# Patient Record
Sex: FEMALE | Race: WHITE
[De-identification: names, ages, dates, MRNs, and addresses within clinical notes are randomized per-mention and may not be internally consistent; named-entity substitution may affect disease eponyms.]

---
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
## 2021-12-22 ENCOUNTER — EMERGENCY (EMERGENCY)
Facility: HOSPITAL | Age: 21
LOS: 1 days | Discharge: HOME | End: 2021-12-22
Admitting: EMERGENCY MEDICINE
Payer: COMMERCIAL

## 2021-12-22 ENCOUNTER — EMERGENCY (EMERGENCY)
Facility: HOSPITAL | Age: 21
LOS: 0 days | Discharge: ANOTHER HOSPITAL | End: 2021-12-22
Attending: EMERGENCY MEDICINE
Payer: COMMERCIAL

## 2021-12-22 VITALS
OXYGEN SATURATION: 100 % | SYSTOLIC BLOOD PRESSURE: 107 MMHG | RESPIRATION RATE: 16 BRPM | HEART RATE: 79 BPM | DIASTOLIC BLOOD PRESSURE: 62 MMHG | HEIGHT: 67 IN | TEMPERATURE: 98 F | WEIGHT: 190.04 LBS

## 2021-12-22 VITALS
OXYGEN SATURATION: 100 % | DIASTOLIC BLOOD PRESSURE: 70 MMHG | SYSTOLIC BLOOD PRESSURE: 108 MMHG | RESPIRATION RATE: 18 BRPM | HEART RATE: 72 BPM | TEMPERATURE: 98 F

## 2021-12-22 VITALS
DIASTOLIC BLOOD PRESSURE: 69 MMHG | TEMPERATURE: 98 F | RESPIRATION RATE: 16 BRPM | HEIGHT: 67 IN | SYSTOLIC BLOOD PRESSURE: 119 MMHG | OXYGEN SATURATION: 99 % | HEART RATE: 70 BPM

## 2021-12-22 DIAGNOSIS — J45.909 UNSPECIFIED ASTHMA, UNCOMPLICATED: ICD-10-CM

## 2021-12-22 DIAGNOSIS — Z20.822 CONTACT WITH AND (SUSPECTED) EXPOSURE TO COVID-19: ICD-10-CM
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
## 2021-12-22 DIAGNOSIS — H16.001 UNSPECIFIED CORNEAL ULCER, RIGHT EYE: ICD-10-CM

## 2021-12-22 DIAGNOSIS — H57.13 OCULAR PAIN, BILATERAL: ICD-10-CM
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
## 2021-12-22 DIAGNOSIS — Z91.013 ALLERGY TO SEAFOOD: ICD-10-CM

## 2021-12-22 LAB
FLUAV AG NPH QL: SIGNIFICANT CHANGE UP
FLUBV AG NPH QL: SIGNIFICANT CHANGE UP
SARS-COV-2 RNA SPEC QL NAA+PROBE: SIGNIFICANT CHANGE UP

## 2021-12-22 PROCEDURE — 99285 EMERGENCY DEPT VISIT HI MDM: CPT

## 2021-12-22 PROCEDURE — 99284 EMERGENCY DEPT VISIT MOD MDM: CPT

## 2021-12-22 RX ORDER — MOXIFLOXACIN HCL 0.5 %
2 DROPS OPHTHALMIC (EYE) ONCE
Refills: 0 | Status: COMPLETED | OUTPATIENT
Start: 2021-12-22 | End: 2021-12-22

## 2021-12-22 RX ORDER — IBUPROFEN 200 MG
600 TABLET ORAL ONCE
Refills: 0 | Status: COMPLETED | OUTPATIENT
Start: 2021-12-22 | End: 2021-12-22

## 2021-12-22 RX ORDER — MOXIFLOXACIN HCL 0.5 %
1 DROPS OPHTHALMIC (EYE)
Qty: 1 | Refills: 0
Start: 2021-12-22 | End: 2021-12-28

## 2021-12-22 RX ORDER — MOXIFLOXACIN HCL 0.5 %
2 DROPS OPHTHALMIC (EYE)
Qty: 2 | Refills: 0
Start: 2021-12-22 | End: 2021-12-28

## 2021-12-22 RX ORDER — ERYTHROMYCIN BASE 5 MG/GRAM
1 OINTMENT (GRAM) OPHTHALMIC (EYE) ONCE
Refills: 0 | Status: DISCONTINUED | OUTPATIENT
Start: 2021-12-22 | End: 2021-12-22

## 2021-12-22 RX ADMIN — Medication 2 DROP(S): at 16:19

## 2021-12-22 RX ADMIN — Medication 600 MILLIGRAM(S): at 16:20

## 2021-12-22 NOTE — ED PROVIDER NOTE - CLINICAL SUMMARY MEDICAL DECISION MAKING FREE TEXT BOX
22 y/o female transfer from [facility] for possible corneal ulcer  -optho consult  -

## 2021-12-22 NOTE — ED PROVIDER NOTE - PROGRESS NOTE DETAILS
Opthalmology Dr. Vidales and Shahram consulted, recommended transfer for further evaluation. One dose of moxifloxacin given, per optho request hold further dose until seen. Pt stable for transfer.

## 2021-12-22 NOTE — ED ADULT TRIAGE NOTE - HEART RATE (BEATS/MIN)
No results found for: HGBA1C    Recent Labs      09/04/18   1129  09/04/18   1559  09/04/18   2115  09/05/18   0719   POCGLU  140  155*  171*  130       Blood Sugar Average: Last 72 hrs:  · (P) 547 [id number]   · Insulin was decreased due to hypoglycemia-- blood sugars have been better controlled   · Monitor blood sugars 70

## 2021-12-22 NOTE — ED PROVIDER NOTE - CLINICAL SUMMARY MEDICAL DECISION MAKING FREE TEXT BOX
DDx: Corneal ulcer.   Plan: Irrigation, Azithromycin and Optho follow up. DDx: Corneal ulcer. Concerning given contact lens wearer  Plan: Irrigation, moxifloxacin and Optho follow up.

## 2021-12-22 NOTE — ED ADULT TRIAGE NOTE - CHIEF COMPLAINT QUOTE
burning both eyes rt eye worse after using .9ns to flush eyes and removed contacts Pt states wasn't her normal brand, and is having light sensitivity. "feels like soap is in her eyes"

## 2021-12-22 NOTE — ED PROVIDER NOTE - OBJECTIVE STATEMENT
22 y/o F with a PMHx of Asthma presents to the ED c/o bilateral eye burning x2 days. Patient states her RT eye hurts more than the LT. Patient states she is unable to open her eyes due to burning. Patient uses disposable monthly contacts. Patient did not have her regular contact solution and brought a different saline solution. Patient kept the contacts in for less than 1 day. Since using the saline solution Patient has been having more irration and light sensitivity to the eyes.

## 2021-12-22 NOTE — ED PROVIDER NOTE - EYE, RIGHT
1mm diameter ulcer in the upper part of cornea. 1mm diameter ulcer in the upper part of cornea on R. eye with fluorescein uptake. EOMI

## 2021-12-22 NOTE — ED PROVIDER NOTE - PHYSICAL EXAMINATION
Gen: Well appearing in NAD  Head: NC/AT  Neck: trachea midline  Resp:  No distress  Ext: no deformities  Neuro:  A&O appears non focal  Skin:  Warm and dry as visualized  Psych:  Normal affect and mood     eye exam deferred - already being performed by optho - see consult note

## 2021-12-22 NOTE — ED PROVIDER NOTE - PATIENT PORTAL LINK FT
You can access the FollowMyHealth Patient Portal offered by [facility] by registering at the following website: [email or web address]. By joining [employer]’s FollowMyHealth portal, you will also be able to view your health information using other applications (apps) compatible with our system.

## 2021-12-22 NOTE — CONSULT NOTE ADULT - ASSESSMENT
***INCOMPLETE NOTE***  Assessment and Recommendations:  21y female w/ no pmh/poh presenting for OD pain and photophobia after taking out contact lens 1 day ago. Used saline solution to soak contact lens day prior. Found to have corneal abrasion right eye, no infiltrate seen     # corneal abrasion right eye  -no infiltrates seen  - start vigamox q1 hour to right eye (throughout the night)  - cornea swabbed, eye culture sent  - contact lens holiday  - will f/u outpatient clinic tomorrow 9am at address below      Holley Rodríguez MD PGY-2  [phone]  Also available on [employer] Teams    Outpatient follow-up: Patient should follow-up with his/her ophthalmologist or with [facility] Department of Ophthalmology at the address below     [facility]  [address] [place] [address]  [phone]  Assessment and Recommendations:  21y female w/ no pmh/poh presenting for OD pain and photophobia after taking out contact lens 1 day ago. Used saline solution to soak contact lens day prior. Found to have corneal abrasion right eye, no infiltrate seen.    # corneal abrasion right eye  -no infiltrates seen  - start vigamox q1 hour to right eye (throughout the night)  - cornea swabbed, eye culture sent  - contact lens holiday  - will f/u outpatient clinic tomorrow 9am at address below    Discussed with Dr. Aquino, chief.     Holley Rodríguez MD PGY-2  [phone]  Also available on [employer] Teams    Outpatient follow-up: Patient should follow-up with his/her ophthalmologist or with [facility] Department of Ophthalmology at the address below     [facility]  [address] [place] [address] [address] [place] [id number]  [phone]

## 2021-12-22 NOTE — ED PROVIDER NOTE - NSFOLLOWUPINSTRUCTIONS_ED_ALL_ED_FT
Patient should follow-up with his/her ophthalmologist or with [facility] Department of Ophthalmology tomorrow 12/23/2021 at the address below    [facility]  [address] [facility] [address] [place] [address]  [phone]     Please use Vigamox antibiotic drops to affected eye - one drop every hour until seeing eye doctor tomorrow.    Corneal Ulcer    A corneal ulcer is an open sore on the cornea. The cornea is the clear covering at the front and center of the eye. Corneal ulcers can affect your vision and may cause permanent damage if they are not treated.    What are the causes?  This condition may be caused by:    An infection. This is the most common cause. The infection may be from:    Bacteria.  Virus.  Fungus.  Parasite.    Foreign bodies in the eye, such as sand, glass, or small pieces of metal.  Dry eye syndrome.  Certain conditions that prevent eyelids from closing completely, such as Bell palsy.  An eye injury.  Contact lenses.    What increases the risk?  The following factors may make you more likely to develop this condition:    Wearing your contact lenses for too long or not taking care of them properly.  Having a weakened disease-fighting (immune) system.  Having a history of cold sores, chicken pox, or shingles.  Using steroid eye drops.    What are the signs or symptoms?  Symptoms of this condition include:    Eye pain. The pain is often severe.  Blurry vision.  Sensitivity to light.  Pus or thick discharge coming from your eye.  Eye redness.  Feeling like something is in your eye.  Watery or itchy eye.  Burning or stinging feeling.    When ulcers get large, they may be seen as a white spot on the cornea.    How is this diagnosed?  This condition is diagnosed based on your symptoms and medical history as well as an eye exam. Your health care provider may use a type of microscope (slit lamp) to examine the cornea. Eye drops may be put into the eye to make the ulcer easier to see.    Tissue samples or cultures from the eye may be done to see if an infection is causing the corneal ulcer. Numbing eye drops will be given before any samples or cultures are taken. The samples or cultures will be checked in the lab for bacteria, viruses, fungi, or parasites.    How is this treated?  Treatment for this condition depends on the cause. If your ulcer is severe, you may be given antibiotic eye drops until your health care provider knows the test results. Other treatments may include:    Antibiotic medicines by mouth, or medicines in the form of ointments or eye drops, to treat infections caused by bacteria, viruses, parasites, or funguses.  Over-the-counter or prescription pain medicine.  Steroid eye drops if the eye is inflamed and swollen.  An injection of medicine under the thin membrane covering the eyeball (conjunctiva). This allows medicine to reach the ulcer in high doses.  Removing the foreign body that caused the eye injury.  Artificial tears or a bandage contact lens if severe dry eyes caused the corneal ulcer.  Eye patching to reduce irritation from blinking and bright light.    If the corneal ulcer causes a scar on the cornea that interferes with vision, surgery may be needed to replace the cornea (corneal transplant).    Follow these instructions at home:  Medicines     Take or apply any over-the-counter and prescription medicines only as told by your health care provider.  If you were prescribed an antibiotic medicine, including pills, eye drops, or ointment, use it as told by your health care provider. Do not stop using the antibiotic even if your condition improves.    You may have to apply eye drops every few minutes to every hour for several days.  It may be necessary to set your alarm clock every few minutes to every hour during the night.    Use artificial tears as needed if you have dry eyes.  Lifestyle     Avoid wearing eye makeup.  Avoid bright lights. Use sunglasses if you have light sensitivity.  Do not wear contact lenses until your health care provider approves.  General instructions     Do not touch or rub your eye. This may increase the irritation and spread the infection.  If directed, wear your eye patch as told.  Do not drive or operate heavy machinery until your health care provider approves.  Apply cool packs to your eye to relieve discomfort and swelling.  If you have an infection, wash your hands often with soap and water. If soap and water are not available, use hand .  Keep all follow-up visits as told by your health care provider. This is important.  How is this prevented?  If you normally wear contact lenses:    Do not wear contact lenses while you sleep.  Wash your hands before removing contact lenses.  Properly sterilize and store your contact lenses.  Regularly clean your contact lens case.  Do not use your saliva or tap water to clean or wet your contact lenses.  Remove your contact lenses if your eye becomes irritated. You may put them back in once your eyes feel better.    Contact a health care provider if:  Your pain gets worse.  You have more discharge coming from your eye.  Get help right away if:  You have a change in vision.  Your eyelids or the skin around them is red or swollen.  Summary  A corneal ulcer is an open sore on the cornea.  Severe eye pain is a common symptom of a corneal ulcer.  Treatment for a corneal ulcer depends on the cause. It may include antibacterial, antiviral, or antifungal eye drops or ointment.

## 2021-12-22 NOTE — ED ADULT NURSE NOTE - OBJECTIVE STATEMENT
Pt presents to ED c/o bilateral eye pain/irritation, difficulty seeing, light sensitivity and burning X2 days R>L since wearing a new pair of monthly contacts. Pt denies headache, N/V, dizziness.

## 2021-12-22 NOTE — CONSULT NOTE ADULT - SUBJECTIVE AND OBJECTIVE BOX
[facility] DEPARTMENT OF OPHTHALMOLOGY - INITIAL ADULT CONSULT  -----------------------------------------------------------------------------------------------------------  Holley Rodríguez MD PGY-2  [phone]  Also available on Microsoft Teams  -----------------------------------------------------------------------------------------------------------    HPI: 22 y/o F with a PMHx of Asthma presents to the ED as a trasnfer from [facility] for optho evaluation for corneal ulcer. Pt. c/o bilateral eye burning x2 days. Patient states her RT eye hurts more than the LT. Patient states she is unable to open her eyes due to burning. Patient uses disposable monthly contacts. Patient did not have her regular contact solution and brought a different saline solution. Patient kept the contacts in for less than 1 day. Since using the saline solution Patient has been having more irration and light sensitivity to the eyes. Seen at [facility] - ulcer seen on right cornea and sent to ER for eval by opthalmology.    Interval History: Uses monthly contacts, does not overuse them over a month. Denies sleeping or taking showers in contact lens. On Monday she bought saline solution to clean her contacts bc she ran out of Biotrue solution. Next day, started to feel irritation to the eyes when she took out her contacts. Woke up this morning with pain and photophobia and came to ED.     PAST MEDICAL & SURGICAL HISTORY:  Asthma      Past Ocular History: none  Ophthalmic Medications: none  FAMILY HISTORY:    MEDICATIONS  (STANDING):    MEDICATIONS  (PRN):    Allergies & Intolerances:   Seafood (Angioedema)      VITALS: T(C): 36.8 (12-22-21 @ 18:00)  T(F): 98.2 (12-22-21 @ 18:00), Max: 98.3 (12-22-21 @ 12:08)  HR: 70 (12-22-21 @ 18:00) (70 - 79)  BP: 119/69 (12-22-21 @ 18:00) (107/62 - 119/69)  RR:  (16 - 18)  SpO2:  (99% - 100%)  Wt(kg): --  General: AAO x 3, appropriate mood and affect    Ophthalmology Exam:  Visual acuity (sc): 20/25-2 OD, 20/20 OS   Pupils: PERRL OU, no APD  Ttono: 10, 14   Extraocular movements (EOMs): Full OU, no pain, no diplopia  Confrontational Visual Field (CVF): Full OU  Color Plates: 12/12 OU    Slit Lamp Exam (SLE)  External: Flat OU  Lids/Lashes/Lacrimal Ducts: Flat OU    Sclera/Conjunctiva: W+Q OU  Cornea: Cl OU  Anterior Chamber: D+Q OU    Iris: Flat OU  Lens: Cl OU     Fundus Exam: dilated with 1% tropicamide and 2.5% phenylephrine  Approval obtained from primary team for dilation  Patient aware that pupils can remained dilated for at least 4-6 hours  Exam performed with 20D lens    Vitreous: wnl OU  Disc, cup/disc: sharp and pink, 0.4 OU  Macula: wnl OU  Vessels: wnl OU  Periphery: wnl OU    Labs/Imaging:  ***   [facility] DEPARTMENT OF OPHTHALMOLOGY - INITIAL ADULT CONSULT  -----------------------------------------------------------------------------------------------------------  Holley Rodríguez MD PGY-2  [phone]  Also available on Microsoft Teams  -----------------------------------------------------------------------------------------------------------    HPI: 20 y/o F with a PMHx of Asthma presents to the ED as a trasnfer from [facility] for optho evaluation for corneal ulcer. Pt. c/o bilateral eye burning x2 days. Patient states her RT eye hurts more than the LT. Patient states she is unable to open her eyes due to burning. Patient uses disposable monthly contacts. Patient did not have her regular contact solution and brought a different saline solution. Patient kept the contacts in for less than 1 day. Since using the saline solution Patient has been having more irration and light sensitivity to the eyes. Seen at [facility] - ulcer seen on right cornea and sent to ER for eval by opthalmology.    Interval History: Uses monthly contacts, does not overuse them over a month. Denies sleeping or taking showers in contact lens. On Monday she bought saline solution to clean her contacts bc she ran out of Biotrue solution. Next day, started to feel irritation to the eyes when she took out her contacts. Woke up this morning with pain and photophobia and came to ED.     PAST MEDICAL & SURGICAL HISTORY:  Asthma      Past Ocular History: none  Ophthalmic Medications: none  FAMILY HISTORY:    MEDICATIONS  (STANDING):    MEDICATIONS  (PRN):    Allergies & Intolerances:   Seafood (Angioedema)      VITALS: T(C): 36.8 (12-22-21 @ 18:00)  T(F): 98.2 (12-22-21 @ 18:00), Max: 98.3 (12-22-21 @ 12:08)  HR: 70 (12-22-21 @ 18:00) (70 - 79)  BP: 119/69 (12-22-21 @ 18:00) (107/62 - 119/69)  RR:  (16 - 18)  SpO2:  (99% - 100%)  Wt(kg): --  General: AAO x 3, appropriate mood and affect    Ophthalmology Exam:  Visual acuity (sc): 20/25-2 OD, 20/20 OS   Pupils: PERRL OU, no APD  Ttono: 10, 14   Extraocular movements (EOMs): Full OU, no pain, no diplopia  Confrontational Visual Field (CVF): Full OU  Color Plates: 12/12 OU    Slit Lamp Exam (SLE)  External: Flat OU  Lids/Lashes/Lacrimal Ducts: mucopurulent discharge OD, wnl OS  Sclera/Conjunctiva: 1+ injection OD, W+Q OS  Cornea: 4mm x4mm central epi defect OD, no infiltrates seen. 2+ spk OS.  Anterior Chamber: D+Q OU    Iris: Flat OU  Lens: Cl OU     Fundus Exam: dilated with 1% tropicamide and 2.5% phenylephrine  Approval obtained from primary team for dilation  Patient aware that pupils can remained dilated for at least 4-6 hours  Exam performed with 20D lens    Vitreous: wnl OU  Disc, cup/disc: sharp and pink, 0.3/0.4   Macula: wnl OU  Vessels: wnl OU  Periphery: wnl OD, lattice OS

## 2021-12-22 NOTE — ED ADULT TRIAGE NOTE - CHIEF COMPLAINT QUOTE
Patient arrives with ems as a transfer from [facility] for a right corneal ulcer. Patient has right eye pain , blurry vision and sensitivity to light.

## 2021-12-22 NOTE — ED ADULT NURSE NOTE - CHPI ED NUR SYMPTOMS NEG
no blurred vision/no discharge/no double vision/no eye lid swelling/no foreign body/no itching/no purulent drainage

## 2021-12-24 LAB
CULTURE RESULTS: SIGNIFICANT CHANGE UP
SPECIMEN SOURCE: SIGNIFICANT CHANGE UP

## 2022-04-13 ENCOUNTER — APPOINTMENT (OUTPATIENT)
Dept: NEUROLOGY | Facility: CLINIC | Age: 22
End: 2022-04-13
Payer: COMMERCIAL

## 2022-04-13 ENCOUNTER — LABORATORY RESULT (OUTPATIENT)
Age: 22
End: 2022-04-13

## 2022-04-13 VITALS
HEART RATE: 68 BPM | DIASTOLIC BLOOD PRESSURE: 65 MMHG | WEIGHT: 191 LBS | SYSTOLIC BLOOD PRESSURE: 113 MMHG | HEIGHT: 67 IN | OXYGEN SATURATION: 99 % | TEMPERATURE: 98.2 F | BODY MASS INDEX: 29.98 KG/M2

## 2022-04-13 DIAGNOSIS — Z78.9 OTHER SPECIFIED HEALTH STATUS: ICD-10-CM

## 2022-04-13 DIAGNOSIS — G62.9 POLYNEUROPATHY, UNSPECIFIED: ICD-10-CM

## 2022-04-13 DIAGNOSIS — R44.9 UNSPECIFIED SYMPTOMS AND SIGNS INVOLVING GENERAL SENSATIONS AND PERCEPTIONS: ICD-10-CM

## 2022-04-13 PROCEDURE — 99205 OFFICE O/P NEW HI 60 MIN: CPT

## 2022-04-16 ENCOUNTER — APPOINTMENT (OUTPATIENT)
Dept: MRI IMAGING | Facility: CLINIC | Age: 22
End: 2022-04-16

## 2022-04-23 ENCOUNTER — APPOINTMENT (OUTPATIENT)
Dept: MRI IMAGING | Facility: HOSPITAL | Age: 22
End: 2022-04-23
Payer: COMMERCIAL

## 2022-04-23 ENCOUNTER — OUTPATIENT (OUTPATIENT)
Dept: OUTPATIENT SERVICES | Facility: HOSPITAL | Age: 22
LOS: 1 days | End: 2022-04-23
Payer: COMMERCIAL

## 2022-04-23 ENCOUNTER — RESULT REVIEW (OUTPATIENT)
Age: 22
End: 2022-04-23

## 2022-04-23 DIAGNOSIS — R44.9 UNSPECIFIED SYMPTOMS AND SIGNS INVOLVING GENERAL SENSATIONS AND PERCEPTIONS: ICD-10-CM

## 2022-04-23 PROCEDURE — 70553 MRI BRAIN STEM W/O & W/DYE: CPT

## 2022-04-23 PROCEDURE — A9579: CPT

## 2022-04-23 PROCEDURE — 70553 MRI BRAIN STEM W/O & W/DYE: CPT | Mod: 26

## 2022-04-27 ENCOUNTER — TRANSCRIPTION ENCOUNTER (OUTPATIENT)
Age: 22
End: 2022-04-27

## 2022-04-27 ENCOUNTER — APPOINTMENT (OUTPATIENT)
Dept: NEUROLOGY | Facility: CLINIC | Age: 22
End: 2022-04-27
Payer: COMMERCIAL

## 2022-04-27 LAB
25(OH)D3 SERPL-MCNC: 14.3 NG/ML
A-TOCOPHEROL VIT E SERPL-MCNC: 8.4 MG/L
ALBUMIN SERPL ELPH-MCNC: 4.5 G/DL
ALP BLD-CCNC: 72 U/L
ALT SERPL-CCNC: 27 U/L
ANA SER IF-ACNC: NEGATIVE
ANION GAP SERPL CALC-SCNC: 15 MMOL/L
AST SERPL-CCNC: 20 U/L
BASOPHILS # BLD AUTO: 0.06 K/UL
BASOPHILS NFR BLD AUTO: 1 %
BETA+GAMMA TOCOPHEROL SERPL-MCNC: 1.1 MG/L
BILIRUB SERPL-MCNC: 0.3 MG/DL
BUN SERPL-MCNC: 11 MG/DL
CALCIUM SERPL-MCNC: 10.3 MG/DL
CHLORIDE SERPL-SCNC: 106 MMOL/L
CO2 SERPL-SCNC: 21 MMOL/L
CREAT SERPL-MCNC: 0.8 MG/DL
CRP SERPL-MCNC: <3 MG/L
EGFR: 108 ML/MIN/1.73M2
EOSINOPHIL # BLD AUTO: 0.9 K/UL
EOSINOPHIL NFR BLD AUTO: 15.4 %
ERYTHROCYTE [SEDIMENTATION RATE] IN BLOOD BY WESTERGREN METHOD: 33 MM/HR
FOLATE SERPL-MCNC: 8.1 NG/ML
GLUCOSE SERPL-MCNC: 84 MG/DL
HCT VFR BLD CALC: 38.4 %
HGB BLD-MCNC: 11.7 G/DL
HOMOCYSTEINE LEVEL: 10.5 UMOL/L
IMM GRANULOCYTES NFR BLD AUTO: 0.2 %
LYMPHOCYTES # BLD AUTO: 2.05 K/UL
LYMPHOCYTES NFR BLD AUTO: 35 %
MAGNESIUM SERPL-MCNC: 1.9 MG/DL
MAN DIFF?: NORMAL
MCHC RBC-ENTMCNC: 27.6 PG
MCHC RBC-ENTMCNC: 30.5 GM/DL
MCV RBC AUTO: 90.6 FL
METHYLMALONIC ACID LEVEL: 150 NMOL/L
MONOCYTES # BLD AUTO: 0.36 K/UL
MONOCYTES NFR BLD AUTO: 6.2 %
NEUTROPHILS # BLD AUTO: 2.47 K/UL
NEUTROPHILS NFR BLD AUTO: 42.2 %
PHOSPHATE SERPL-MCNC: 3.5 MG/DL
PLATELET # BLD AUTO: 365 K/UL
POTASSIUM SERPL-SCNC: 4.4 MMOL/L
PROT SERPL-MCNC: 7.7 G/DL
RBC # BLD: 4.24 M/UL
RBC # FLD: 13.2 %
SODIUM SERPL-SCNC: 142 MMOL/L
T PALLIDUM AB SER QL IA: NEGATIVE
TSH SERPL-ACNC: 2.44 UIU/ML
VIT B12 SERPL-MCNC: 769 PG/ML
VIT B6 SERPL-MCNC: 9.9 UG/L
WBC # FLD AUTO: 5.85 K/UL

## 2022-04-27 PROCEDURE — 99215 OFFICE O/P EST HI 40 MIN: CPT | Mod: 95

## 2022-04-29 LAB — VIT B1 SERPL-MCNC: 64.5 NMOL/L

## 2022-05-03 ENCOUNTER — APPOINTMENT (OUTPATIENT)
Dept: RHEUMATOLOGY | Facility: CLINIC | Age: 22
End: 2022-05-03
Payer: COMMERCIAL

## 2022-05-03 ENCOUNTER — NON-APPOINTMENT (OUTPATIENT)
Age: 22
End: 2022-05-03

## 2022-05-03 VITALS
HEIGHT: 67 IN | TEMPERATURE: 97.8 F | BODY MASS INDEX: 29.82 KG/M2 | OXYGEN SATURATION: 99 % | DIASTOLIC BLOOD PRESSURE: 80 MMHG | WEIGHT: 190 LBS | SYSTOLIC BLOOD PRESSURE: 120 MMHG | HEART RATE: 66 BPM

## 2022-05-03 DIAGNOSIS — R20.2 PARESTHESIA OF SKIN: ICD-10-CM

## 2022-05-03 DIAGNOSIS — R19.7 DIARRHEA, UNSPECIFIED: ICD-10-CM

## 2022-05-03 DIAGNOSIS — R59.1 GENERALIZED ENLARGED LYMPH NODES: ICD-10-CM

## 2022-05-03 DIAGNOSIS — R51.9 HEADACHE, UNSPECIFIED: ICD-10-CM

## 2022-05-03 DIAGNOSIS — R13.10 DYSPHAGIA, UNSPECIFIED: ICD-10-CM

## 2022-05-03 PROCEDURE — 99204 OFFICE O/P NEW MOD 45 MIN: CPT

## 2022-05-03 RX ORDER — METHYLPREDNISOLONE 4 MG/1
4 TABLET ORAL
Qty: 1 | Refills: 0 | Status: DISCONTINUED | COMMUNITY
Start: 2022-04-13 | End: 2022-05-03

## 2022-05-03 NOTE — ASSESSMENT
[FreeTextEntry1] : 21 RHF with right arm and hand sensory deficit, concerning for peripheral neuropathy or rheumatological condition given history of elevated SG level, elbow and knee joint locking, and alternating constipation and diarrhea.

## 2022-05-03 NOTE — HISTORY OF PRESENT ILLNESS
[FreeTextEntry1] : In February 2022, pt began to experience diarrhea with weight loss, which stopped briefly after a few weeks, then returned and then stopped again. She saw a GI doctor when she was having the diarrhea, had an EGD and colonoscopy with reportedly a biopsy which demonstrated "inflammation" but she was told that there were no abnormalities seen during the procedure. She was not given any specific diagnosis. A few weeks after her diarrhea resolved, she began to experience constipation, but the constipation subsequently resolved. However, after her GI issues pt experienced other symptoms on and off, including:\par - swollen ?lymph nodes - pt said she had swelling and pain in the front of her neck with difficulty swallowing\par - Fatigue and shortness of breath with exercise\par - Frontal headaches, last time yesterday, which are unpredictable\par - R arm numbness and tingling radiating from her proximal R arm to her R hand\par - Lightheadedness with exertion - currently her biggest problem; pt feels like she is about to faint with any rigorous activity or with prolonged standing\par \par The above symptoms are unpredictable and come and go from day to day.\par \par Pt saw neurology, had brain MRI which did not demonstrate abnormalities, also labs which demonstrated ESR 33 but otherwise were non-contributory, including a negative SG and a normal TSH.\par \par She denies recent travel, rashes, eye problems, hearing problems, vertigo, blood in her stool, joint pains or swelling.\par \par Of note, pt had abdominal discomfort approx 1 year ago, reportedly had bloodwork which was positive for celiac disease at that time, but with her recent symptoms she had repeat celiac blood tests which were negative. She has been on a gluten-free diet on and off for the past year.\par \par Physical exam: GEN: AAO woman sitting on exam table, somewhat flat affect\par SKIN: no rashes\par MOUTH: Moist mucous membranes, no oral ulcers, normal oral aperture\par ENT: no LAD\par PULM: Clear to auscultation b/l\par CV: Regular rate and rhythm, no murmurs\par MSK:\par Neck: 5/5 MS in flexors/extensors\par Shoulders: Full ROM b/l, 5/5 MS b/l\par Biceps/triceps: 5/5 MS b/l\par Elbows: Full ROM b/l, no effusions\par Wrists: Full ROM b/l, no effusions\par Hands: No synovitis, intact handgrip b/l\par Hips: 5/5 MS b/l, full ROM b/l\par Quads/hamstrings: 5/5 MS b/l\par Knees: no effusions, full ROM b/l\par Ankles: no effusions, full ROM b/l\par Feet: no effusions, no TTP\par EXT: normal nailfold capillaries, no LE edema b/l

## 2022-05-03 NOTE — REASON FOR VISIT
[Consultation] : a consultation visit [Other: _____] : [unfilled] [FreeTextEntry1] : Numbness in throat and arm

## 2022-05-03 NOTE — HISTORY OF PRESENT ILLNESS
[FreeTextEntry1] : This is a 21-year-old right-handed woman who was experiencing episodes of diarrhea lasting between a few days to 2 weeks at a time since February 2022. She had an evaluation by a gastroenterologist, Dr. Noah Pierce, in [place], [place], and she underwent an esophagogastroduodenoscopy (EGD) and colonoscopy, which were both reported to be normal. Most recently, she had a constipation episode on 4/8/22, she experienced new sensory side effects, feeling like tingling and numbness deep inside her right shoulder. These symptoms have been episodic, but have been occurring almost every day since then, without gastrointestinal symptoms, and have involved her right arm, and most recently in her right palm on a superficial level. During this time, she was experiencing difficulty swallowing, describing a feeling of her throat becoming narrower. Over the past month, she has become easily fatigued with exercises and other exertional activity. She experienced headaches while having these symptoms, but they resolved on their own, and she did not experience neck pain. Her father states that the patient was also experiencing severe stomach cramps, which the patient described as stabbing in nature. These cramps did not benefit from using acetaminophen, ibuprofen, dicyclomine, or PRN ketorolac. She has a history of joints that randomly lock up with activity, and was found to have an elevated SG level, but was told by her pediatric orthopedist and rheumatologist that there was no evidence of an autoimmune condition.

## 2022-05-03 NOTE — REASON FOR VISIT
[Consultation] : a consultation visit [FreeTextEntry1] : Consult request from Dr. Pagan for possible autoimmune etiology of recent symptoms

## 2022-05-03 NOTE — PHYSICAL EXAM
[General Appearance - Alert] : alert [General Appearance - In No Acute Distress] : in no acute distress [Oriented To Time, Place, And Person] : oriented to person, place, and time [Impaired Insight] : insight and judgment were intact [Affect] : the affect was normal [Person] : oriented to person [Place] : oriented to place [Time] : oriented to time [Concentration Intact] : normal concentrating ability [Visual Intact] : visual attention was ~T not ~L decreased [Fluency] : fluency intact [Comprehension] : comprehension intact [Cranial Nerves Optic (II)] : visual acuity intact bilaterally,  visual fields full to confrontation, pupils equal round and reactive to light [Cranial Nerves Oculomotor (III)] : extraocular motion intact [Cranial Nerves Trigeminal (V)] : facial sensation intact symmetrically [Cranial Nerves Facial (VII)] : face symmetrical [Cranial Nerves Vestibulocochlear (VIII)] : hearing was intact bilaterally [Cranial Nerves Glossopharyngeal (IX)] : tongue and palate midline [Cranial Nerves Accessory (XI - Cranial And Spinal)] : head turning and shoulder shrug symmetric [Cranial Nerves Hypoglossal (XII)] : there was no tongue deviation with protrusion [Motor Strength] : muscle strength was normal in all four extremities [No Muscle Atrophy] : normal bulk in all four extremities [Motor Handedness Right-Handed] : the patient is right hand dominant [Tactile Decrease Entire Right Arm] : diminished right arm [Pain / Temperature Decrease Entire Arm Right] : diminished right arm [Balance] : balance was intact [2+] : Ankle jerk left 2+ [Sclera] : the sclera and conjunctiva were normal [PERRL With Normal Accommodation] : pupils were equal in size, round, reactive to light, with normal accommodation [Extraocular Movements] : extraocular movements were intact [Outer Ear] : the ears and nose were normal in appearance [Oropharynx] : the oropharynx was normal [Neck Appearance] : the appearance of the neck was normal [Auscultation Breath Sounds / Voice Sounds] : lungs were clear to auscultation bilaterally [Heart Rate And Rhythm] : heart rate was normal and rhythm regular [Heart Sounds] : normal S1 and S2 [Arterial Pulses Carotid] : carotid pulses were normal with no bruits [Full Pulse] : the pedal pulses are present [Abdomen Soft] : soft [Abdomen Tenderness] : non-tender [No CVA Tenderness] : no ~M costovertebral angle tenderness [No Spinal Tenderness] : no spinal tenderness [Abnormal Walk] : normal gait [Nail Clubbing] : no clubbing  or cyanosis of the fingernails [Musculoskeletal - Swelling] : no joint swelling seen [Motor Tone] : muscle strength and tone were normal [Skin Color & Pigmentation] : normal skin color and pigmentation [Skin Turgor] : normal skin turgor [] : no rash [Paresis Pronator Drift Right-Sided] : no pronator drift on the right [Paresis Pronator Drift Left-Sided] : no pronator drift on the left [Motor Strength Upper Extremities Bilaterally] : strength was normal in both upper extremities [Motor Strength Lower Extremities Bilaterally] : strength was normal in both lower extremities [Romberg's Sign] : Romberg's sign was negtive [Tactile Decrease Shoulders Right] : normal right shoulder [Tactile Decrease Shoulders Left] : normal left shoulder [Tactile Decrease Entire Left Arm] : normal left arm [Tactile Decrease Hand] : normal left hand [Tactile Decrease Entire Leg Right] : normal right leg [Tactile Decrease Entire Leg Left] : normal left leg [Pain / Temperature Decrease Shoulders Right Only] : normal right shoulder [Pain / Temperature Decrease Shoulders Left Only] : normal left shoulder [Pain / Temperature Decrease Entire Arm Left] : normal left arm [Pain / Temp Decrease Hand] : normal left hand [Pain / Temp Decrease Entire Leg - Right] : normal right leg [Pain / Temp Decrease Entire Leg - Left] : normal left leg [Past-pointing] : there was no past-pointing [Tremor] : no tremor present [Coordination - Dysmetria Impaired Finger-to-Nose Bilateral] : not present [Coordination - Dysmetria Impaired Heel-to-Shin Bilateral] : not present [Plantar Reflex Right Only] : normal on the right [Plantar Reflex Left Only] : normal on the left [___] : absent on the right [___] : absent on the left [FreeTextEntry8] : Normal, narrow-based gait. No difficulty with tiptoe, heel, and tandem gaits.

## 2022-05-03 NOTE — DATA REVIEWED
[de-identified] : MRI Brain w/wo Gadolinium (4/23/22):\par - No acute intracranial abnormality or abnormal enhancement\par - Few T2/FLAIR hyperintensities present

## 2022-05-03 NOTE — HISTORY OF PRESENT ILLNESS
[FreeTextEntry1] : FROM 4/13/22:\par This is a 21-year-old right-handed woman who was experiencing episodes of diarrhea lasting between a few days to 2 weeks at a time since February 2022. She had an evaluation by a gastroenterologist, Dr. Noah Pierce, in [place], [place], and she underwent an esophagogastroduodenoscopy (EGD) and colonoscopy, which were both reported to be normal. Most recently, she had a constipation episode on 4/8/22, she experienced new sensory side effects, feeling like tingling and numbness deep inside her right shoulder. These symptoms have been episodic, but have been occurring almost every day since then, without gastrointestinal symptoms, and have involved her right arm, and most recently in her right palm on a superficial level. During this time, she was experiencing difficulty swallowing, describing a feeling of her throat becoming narrower. Over the past month, she has become easily fatigued with exercises and other exertional activity. She experienced headaches while having these symptoms, but they resolved on their own, and she did not experience neck pain. Her father states that the patient was also experiencing severe stomach cramps, which the patient described as stabbing in nature. These cramps did not benefit from using acetaminophen, ibuprofen, dicyclomine, or PRN ketorolac. She has a history of joints that randomly lock up with activity, and was found to have an elevated SG level, but was told by her pediatric orthopedist and rheumatologist that there was no evidence of an autoimmune condition.\par \par FROM 4/2722:\par This appointment is conducted via real-time two-way audiovisual technology to accommodate a close follow-up appointment to review results. Verbal consent for this encounter was received by the patient. The patient was located at the home address, and I was located in [place], [place]. The patient continues to have intermittent numbness and tingling sensations in her right arm and throat.

## 2022-05-03 NOTE — PHYSICAL EXAM
[General Appearance - Alert] : alert [General Appearance - In No Acute Distress] : in no acute distress [Oriented To Time, Place, And Person] : oriented to person, place, and time [Impaired Insight] : insight and judgment were intact [Affect] : the affect was normal [Person] : oriented to person [Place] : oriented to place [Time] : oriented to time [Concentration Intact] : normal concentrating ability [Visual Intact] : visual attention was ~T not ~L decreased [Fluency] : fluency intact [Comprehension] : comprehension intact [Cranial Nerves Optic (II)] : visual acuity intact bilaterally,  visual fields full to confrontation, pupils equal round and reactive to light [Cranial Nerves Oculomotor (III)] : extraocular motion intact [Cranial Nerves Trigeminal (V)] : facial sensation intact symmetrically [Cranial Nerves Facial (VII)] : face symmetrical [Cranial Nerves Vestibulocochlear (VIII)] : hearing was intact bilaterally [Cranial Nerves Glossopharyngeal (IX)] : tongue and palate midline [Cranial Nerves Accessory (XI - Cranial And Spinal)] : head turning and shoulder shrug symmetric [Cranial Nerves Hypoglossal (XII)] : there was no tongue deviation with protrusion [Motor Tone] : muscle tone was normal in all four extremities [Motor Strength] : muscle strength was normal in all four extremities [No Muscle Atrophy] : normal bulk in all four extremities [Motor Handedness Right-Handed] : the patient is right hand dominant [Tactile Decrease Entire Right Arm] : diminished right arm [Pain / Temperature Decrease Entire Arm Right] : diminished right arm [Balance] : balance was intact [Sclera] : the sclera and conjunctiva were normal [PERRL With Normal Accommodation] : pupils were equal in size, round, reactive to light, with normal accommodation [Extraocular Movements] : extraocular movements were intact [Outer Ear] : the ears and nose were normal in appearance [Oropharynx] : the oropharynx was normal [Neck Appearance] : the appearance of the neck was normal [Abnormal Walk] : normal gait [Nail Clubbing] : no clubbing  or cyanosis of the fingernails [Skin Color & Pigmentation] : normal skin color and pigmentation [] : no rash [Paresis Pronator Drift Right-Sided] : no pronator drift on the right [Paresis Pronator Drift Left-Sided] : no pronator drift on the left [Motor Strength Upper Extremities Bilaterally] : strength was normal in both upper extremities [Motor Strength Lower Extremities Bilaterally] : strength was normal in both lower extremities [Romberg's Sign] : Romberg's sign was negtive [Tactile Decrease Shoulders Right] : normal right shoulder [Tactile Decrease Shoulders Left] : normal left shoulder [Tactile Decrease Entire Left Arm] : normal left arm [Tactile Decrease Hand] : normal left hand [Tactile Decrease Entire Leg Right] : normal right leg [Tactile Decrease Entire Leg Left] : normal left leg [Pain / Temperature Decrease Shoulders Right Only] : normal right shoulder [Pain / Temperature Decrease Shoulders Left Only] : normal left shoulder [Pain / Temperature Decrease Entire Arm Left] : normal left arm [Pain / Temp Decrease Hand] : normal left hand [Pain / Temp Decrease Entire Leg - Right] : normal right leg [Pain / Temp Decrease Entire Leg - Left] : normal left leg [Past-pointing] : there was no past-pointing [Tremor] : no tremor present [Coordination - Dysmetria Impaired Finger-to-Nose Bilateral] : not present [Coordination - Dysmetria Impaired Heel-to-Shin Bilateral] : not present [FreeTextEntry8] : Normal, narrow-based gait. Specialized gait testing deferred.

## 2022-05-03 NOTE — ASSESSMENT
[FreeTextEntry1] : Symptoms: Pt's constellation of symptoms is not specific for any particular rheumatologic condition. While some of the symptoms that she describes can be seen in the setting of certain rheumatologic conditions (e.g. numbness and tingling can be seen with Sjogren's syndrome and vasculitis, headaches can be seen with a variety of conditions including SLE and vasculitis, lymphadenopathy can be seen with SLE), she does not have more typical symptoms of these conditions. Also certain rheumatologic processes such as vasculitis can be a/w GI manifestations, but typically patients would be expected to have abnormal findings on objective GI testing, and also it would be less likely that the symptoms would come and go. \par - f/u bloodwork for infectious processes and certain autoimmune conditions that can manifest with the symptoms pt describes\par - Advised pt to follow up with her PMD Dr. Lambert about possibly having an EKG given her pre-syncopal symptoms, as this office does not have an EKG machine\par

## 2022-05-04 ENCOUNTER — APPOINTMENT (OUTPATIENT)
Dept: MRI IMAGING | Facility: HOSPITAL | Age: 22
End: 2022-05-04
Payer: COMMERCIAL

## 2022-05-04 ENCOUNTER — LABORATORY RESULT (OUTPATIENT)
Age: 22
End: 2022-05-04

## 2022-05-04 ENCOUNTER — OUTPATIENT (OUTPATIENT)
Dept: OUTPATIENT SERVICES | Facility: HOSPITAL | Age: 22
LOS: 1 days | End: 2022-05-04
Payer: COMMERCIAL

## 2022-05-04 DIAGNOSIS — R93.3 ABNORMAL FINDINGS ON DIAGNOSTIC IMAGING OF OTHER PARTS OF DIGESTIVE TRACT: ICD-10-CM

## 2022-05-04 PROCEDURE — 74183 MRI ABD W/O CNTR FLWD CNTR: CPT

## 2022-05-04 PROCEDURE — 74183 MRI ABD W/O CNTR FLWD CNTR: CPT | Mod: 26

## 2022-05-04 PROCEDURE — 72197 MRI PELVIS W/O & W/DYE: CPT

## 2022-05-04 PROCEDURE — A9579: CPT

## 2022-05-04 PROCEDURE — 72197 MRI PELVIS W/O & W/DYE: CPT | Mod: 26

## 2022-05-07 ENCOUNTER — APPOINTMENT (OUTPATIENT)
Dept: MRI IMAGING | Facility: HOSPITAL | Age: 22
End: 2022-05-07

## 2022-05-09 LAB
ALBUMIN MFR SERPL ELPH: 53.7 %
ALBUMIN SERPL-MCNC: 4.4 G/DL
ALBUMIN/GLOB SERPL: 1.2 RATIO
ALPHA1 GLOB MFR SERPL ELPH: 3.5 %
ALPHA1 GLOB SERPL ELPH-MCNC: 0.3 G/DL
ALPHA2 GLOB MFR SERPL ELPH: 10.6 %
ALPHA2 GLOB SERPL ELPH-MCNC: 0.9 G/DL
B-GLOBULIN MFR SERPL ELPH: 12 %
B-GLOBULIN SERPL ELPH-MCNC: 1 G/DL
ENA SS-A AB SER IA-ACNC: <0.2 AL
ENA SS-B AB SER IA-ACNC: <0.2 AL
GAMMA GLOB FLD ELPH-MCNC: 1.7 G/DL
GAMMA GLOB MFR SERPL ELPH: 20.2 %
HBV CORE IGM SER QL: NONREACTIVE
HBV SURFACE AG SER QL: NONREACTIVE
HCV AB SER QL: NONREACTIVE
HCV S/CO RATIO: 0.19 S/CO
HETEROPH AB SER QL: NEGATIVE
INTERPRETATION SERPL IEP-IMP: NORMAL
PROT SERPL-MCNC: 8.2 G/DL
PROT SERPL-MCNC: 8.2 G/DL

## 2022-06-08 ENCOUNTER — APPOINTMENT (OUTPATIENT)
Dept: RHEUMATOLOGY | Facility: CLINIC | Age: 22
End: 2022-06-08

## 2022-08-08 ENCOUNTER — APPOINTMENT (OUTPATIENT)
Dept: NEUROLOGY | Facility: CLINIC | Age: 22
End: 2022-08-08

## 2022-08-08 DIAGNOSIS — R20.0 ANESTHESIA OF SKIN: ICD-10-CM

## 2022-08-08 DIAGNOSIS — R20.2 ANESTHESIA OF SKIN: ICD-10-CM

## 2022-08-08 PROCEDURE — 95886 MUSC TEST DONE W/N TEST COMP: CPT

## 2022-08-08 PROCEDURE — 95911 NRV CNDJ TEST 9-10 STUDIES: CPT

## 2022-08-08 NOTE — PROCEDURE
[FreeTextEntry1] :   \par                                                                       \par \par Clinical \par Neurophysiology \par Laboratory\par [address] [facility] [address]\[place] [address]\par \par EMG/NCV REPORT\par   \par Full Name:	Rut Levy	YOB: 2000\par Gender:	Female\par   \par Visit Date:	8/8/2022 11:24\par Age:	22 Years 2 Months Old\par Examining Physician:	Kathy\hair Referring Physician:	Ej\par Height:	5 feet 7 inch\par   \par Summary\par   Sensory nerve conductions\par Right median sensory nerve action potential had normal latency, normal amplitude and normal conduction velocity.\par \par Left median sensory nerve action potential had normal latency, normal amplitude and normal conduction velocity.\par \par Bilateral ulnar sensory nerve action potential had normal latency, normal amplitude and normal conduction velocity.\par \par right radial sensory nerve action potential had normal latency, normal amplitude and normal conduction velocity.\par \par Motor nerve conductions\par \par Bilateral median nerve compound motor action potential had normal latency, normal amplitude and normal conduction velocity.\par \par Bilateral ulnar nerve compound motor action potential had normal latency, normal amplitude and normal conduction velocity.\par \par F-wave latency of the bilateral median and bilateral ulnar nerves were within normal limits.\par \par Needle EMG\par Needle EMG was performed using a disposable concentric needle in the following muscles:\par \par Right deltoid, biceps, triceps,  EDC, FDI muscles had no spontaneous activity and normal motor units.\par \par \par Summary: There is no electrophysiologic evidence of neuropathy. There is no evidence of cervical radiculopathy or myopathy.\par \par \par Clinical and radiologic correlation is advised. \par Thank you for the consult,\par Justine Chavez MD\par \par   \par Sensory NCS\par   \par Nerve / Sites	Rec. Site	Onset Lat	Peak Lat	NP Amp	PP Amp	Segments	Distance	Velocity\par 		ms	ms	µV	µV		cm	m/s\par R Median - Digit II (Antidromic)\par    Wrist	Dig II	2.19	2.66	36.8	148.2	Wrist - Dig II	13	59\par L Median - Digit II (Antidromic)\par    Wrist	Dig II	2.14	2.76	74.1	97.9	Wrist - Dig II	14	66\par R Ulnar - Digit V (Antidromic)\par    Wrist	Dig V	1.98	2.55	62.2	53.6	Wrist - Dig V	11	56\par L Ulnar - Digit V (Antidromic)\par    Wrist	Dig V	1.88	2.50	65.2	76.5	Wrist - Dig V	12	64\par R Radial - Anatomical snuff box (Forearm)\par    Forearm	Wrist	1.41	2.14	34.8	22.1	Forearm - Wrist	10	71\par                  \par Motor NCS\par   \par Nerve / Sites	Muscle	Latency	Amplitude	Amp %	Duration	Segments	Distance	Lat Diff	Velocity\par 		ms	mV	%	ms		cm	ms	m/s\par R Median - APB\par    Wrist	APB	2.19	18.0	100	5.52	Wrist - APB	6		\par    Elbow	APB	6.72	17.3	95.9	5.68	Elbow - Wrist	26	4.53	57\par L Median - APB\par    Wrist	APB	2.24	16.4	100	6.09	Wrist - APB	7		\par    Elbow	APB	6.88	15.1	92.2	5.94	Elbow - Wrist	25	4.64	54\par R Ulnar - ADM\par    Wrist	ADM	2.24	12.2	100	4.95	Wrist - ADM	8		\par    B.Elbow	ADM	6.51	8.2	67.4	5.68	B.Elbow - Wrist	28	4.27	66\par    A.Elbow	ADM	8.23	8.4	68.7	5.68	A.Elbow - B.Elbow	10	1.72	58\par L Ulnar - ADM\par    Wrist	ADM	2.08	11.0	100	5.68	Wrist - ADM	7		\par    B.Elbow	ADM	6.51	9.3	84.6	5.99	B.Elbow - Wrist	27	4.43	61\par    A.Elbow	ADM	8.07	8.5	77.8	5.94	A.Elbow - B.Elbow	10	1.56	64\par               \par F  Wave\par   \par Nerve	F Lat	M Lat	F-M Lat\par 	ms	ms	ms\par R Median - APB	26.2	2.5	23.7\par R Ulnar - ADM	26.0	2.0	24.1\par L Median - APB	27.0	2.0	25.0\par L Ulnar - ADM	27.5	2.0	25.5\par               \par EMG\par      \par EMG Summary Table	\par 	Spontaneous	MUAP	Recruitment\par Muscle	Nerve	Roots	IA	Fib	PSW	Fasc	H.F.	Amp	Dur.	PPP	Pattern\par R. Deltoid	Axillary	C5-C6	N	None	None	None	None	N	N	N	N\par R. Biceps brachii	Musculocutaneous	C5-C6	N	None	None	None	None	N	N	N	N\par R. Triceps brachii	Radial	C6-C8	N	None	None	None	None	N	N	N	N\par R. Extensor digitorum communis	Radial	C7-C8	N	None	None	None	None	N	N	N	N\par R. First dorsal interosseous	Ulnar	C8-T1	N	None	None	None	None	N	N	N	N\par \par  \par

## 2022-08-09 ENCOUNTER — OUTPATIENT (OUTPATIENT)
Dept: OUTPATIENT SERVICES | Facility: HOSPITAL | Age: 22
LOS: 1 days | End: 2022-08-09
Payer: COMMERCIAL

## 2022-08-09 ENCOUNTER — APPOINTMENT (OUTPATIENT)
Dept: MRI IMAGING | Facility: HOSPITAL | Age: 22
End: 2022-08-09

## 2022-08-09 DIAGNOSIS — Z00.8 ENCOUNTER FOR OTHER GENERAL EXAMINATION: ICD-10-CM

## 2022-08-09 PROCEDURE — A9579: CPT

## 2022-08-09 PROCEDURE — 70553 MRI BRAIN STEM W/O & W/DYE: CPT | Mod: 26

## 2022-08-09 PROCEDURE — 70547 MR ANGIOGRAPHY NECK W/O DYE: CPT | Mod: 26

## 2022-08-09 PROCEDURE — 70553 MRI BRAIN STEM W/O & W/DYE: CPT

## 2022-08-09 PROCEDURE — 70544 MR ANGIOGRAPHY HEAD W/O DYE: CPT

## 2022-08-09 PROCEDURE — 70547 MR ANGIOGRAPHY NECK W/O DYE: CPT

## 2022-08-09 PROCEDURE — 70544 MR ANGIOGRAPHY HEAD W/O DYE: CPT | Mod: 26,59

## 2022-11-18 NOTE — ED PROVIDER NOTE - NSCAREINITIATED _GEN_ER
----- Message from Lore Guzman NP sent at 11/18/2022 11:49 AM CST -----  Can you please schedule her with hepatology for evaluation of possible cirrhosis of the liver per her abdominal US results and let patient know.  I will place orders for referral.     Prosper Tilley(Attending)

## 2022-12-16 NOTE — ED ADULT NURSE NOTE - LEARNING READINESS
I spoke with the mom and shared the results of the monitor. Mom had no questions or concerns at this time.   Yes

## 2023-06-20 NOTE — ED PROVIDER NOTE - OBJECTIVE STATEMENT
22 y/o F with a PMHx of Asthma presents to the ED as a trasnfer from [facility] for optho evaluation for corneal ulcer. Pt. c/o bilateral eye burning x2 days. Patient states her RT eye hurts more than the LT. Patient states she is unable to open her eyes due to burning. Patient uses disposable monthly contacts. Patient did not have her regular contact solution and brought a different saline solution. Patient kept the contacts in for less than 1 day. Since using the saline solution Patient has been having more irration and light sensitivity to the eyes. Seen at [facility] - ulcer seen on right cornea and sent to ER for eval by opthalmology. No

## 2023-06-26 ENCOUNTER — APPOINTMENT (OUTPATIENT)
Dept: NEUROLOGY | Facility: CLINIC | Age: 23
End: 2023-06-26
Payer: COMMERCIAL

## 2023-06-26 DIAGNOSIS — R41.9 UNSPECIFIED SYMPTOMS AND SIGNS INVOLVING COGNITIVE FUNCTIONS AND AWARENESS: ICD-10-CM

## 2023-06-26 DIAGNOSIS — R42 DIZZINESS AND GIDDINESS: ICD-10-CM

## 2023-06-26 PROCEDURE — 99205 OFFICE O/P NEW HI 60 MIN: CPT | Mod: 95

## 2023-06-26 PROCEDURE — 99215 OFFICE O/P EST HI 40 MIN: CPT | Mod: 95

## 2023-06-26 RX ORDER — ALBUTEROL 90 MCG
AEROSOL (GRAM) INHALATION
Refills: 0 | Status: DISCONTINUED | COMMUNITY
End: 2023-06-26

## 2023-06-26 NOTE — REASON FOR VISIT
[Medical Office: ([facility])___] : at the medical office located in  [Patient] : the patient [Initial Evaluation] : an initial evaluation [Other Location: e.g. School (Enter Location, City,State)___] : at [unfilled], at the time of the visit. [Mother] : mother

## 2023-06-26 NOTE — ASSESSMENT
[FreeTextEntry1] : Assessment/Plan:\par  23 year old right handed female w/ hx of episodic symptoms since 4/2022 (GI symptoms, Tension type headaches, dizziness, cognitive issues, joint pains, paresthesias), here for evaluation to rule out multiple sclerosis. \par \par Unclear etiology for her symptoms. I suspect she may have a systemic process. Lower suspicion for a primary neurological disorder. I reassured patient that given prior normal MRI's and with her clinical history, low suspicion for multiple sclerosis or a primary CNS tumour. \par \par Will plan on scheduling patient for a repeat brain MRI w/w/o contrast. Will also obtain blood work up for autoimmune, infectious and metabolic disorders. I would also like to evaluate her in person to perform a complete neurological exam. She will bring medical records to her upcoming visit for my review. \par \par Return to clinic 4 weeks (week of July 26th)\par \par The above plan was discussed with ROOSEVELT MEDELLIN in great detail.  ROOSEVELT MEDELLIN verbalized understanding and agrees with plan as detailed above. Patient was provided education and counselling on current diagnosis/symptoms. She was advised to call our clinic at [phone] for any new or worsening symptoms, or with any questions or concerns. In case of acute onset of neurological symptoms or worsening presentation, patient was advised to present to nearest emergency room for further evaluation. ROOSEVELT MEDELLIN expressed understanding and all her questions/concerns were addressed.\par \par Renetta Garrett M.D\par

## 2023-06-26 NOTE — HISTORY OF PRESENT ILLNESS
[FreeTextEntry1] : HPI (initial visit Jun 26, 2023)- ROOSEVELT MEDELLIN is a 23 year old woman self referred to rule out multiple sclerosis. \par \par She reports episodic symptoms since 4/2023, initially started with GI symptoms- diarrhea, constipation and abdominal cramps. She is followed by GI, has had extensive GI work up, include EGD/Colonscopy and work up for parasitic infections- all negative. IBS was considered, however pt did not improve with meds. \par She was previously followed by Dr Lee Pagan 4/2023 for tingling/numbness in right shoulder/palm and throat discomfort/dysphagia that began around the same time. She had MR brain and EMG/NCS which were normal.\par Also seen by Rheum for joints pains/locking, and found to have elevated SG, and repeat level normal and rheum determined that there was no evidence of rheum disorder. \par \par i personally reviewed images-\par MRI brain w/w/o 4/23/2023- normal\par MRI Brain and MRA H/N 8/2022- normal\par She had EMG/NCS w./ Dr Chavez 8/8/22 of b/l UE for numbness/tingling - Normal\par \par Labs 4/2022 and 5/2022- significant for low B1 (64.5) and Vit D (14). ESR 33, SPEP w/ polyclonal gammapathy\par           B12 769, CRP\par           SG neg. Lyme neg, Syphilis neg. Vitamin D neg. TSH nl. 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 Mother was also on the call with her. 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 She has done [employer] talks. \par \par She reports she continues to have "flare up" of symptoms, affecting her quality of life. She no longer experiences numbness/tingling symptoms or dysphagia. In the last year, she had also experienced headaches- holocephalic dull headaches w/o any associated photophobia or phonophobia. These headaches have now resolved. She also reports intermittent dizziness (lightheadedness and vertigo)- usually triggered by changes in position. The dizziness is still on and off, not always accompanied by GI flare ups.  Also having cognitive issues- trouble concentrating and comprehending things. Finds herself stuttering. She denies hearing loss or tinnitus. No palpitations. Flare ups can happen 1-2 x /month and cn last 1 week to 1 month. \par \par She denies any stressors.\par Maternal grandma had ? GBM and great grandma had MS.

## 2023-06-26 NOTE — PHYSICAL EXAM
[FreeTextEntry1] : Alert, attentive, in no acute distress \par Appropriate affect and mood\par No dysarthria\par No definite facial asymmetry appreciable. No ptosis\par no drift of UE\par No tremors of UE\par \par

## 2023-06-26 NOTE — DATA REVIEWED
[de-identified] : MRI brain w/w/o 4/23/2023- normal\par MRI Brain and MRA H/N 8/2022- normal\par She had EMG/NCS w./ Dr Chavez 8/8/22 of b/l UE for numbness/tingling - Normal

## 2023-12-21 ENCOUNTER — APPOINTMENT (OUTPATIENT)
Dept: NEUROLOGY | Facility: CLINIC | Age: 23
End: 2023-12-21

## 2024-12-22 NOTE — ED PEDIATRIC NURSE NOTE - BREATHING, MLM
"Bedside report received from off going RN/tech: Landy, assumed care of patient.  POC discussed with patient. All needs addressed at this time.       Fall risk interventions in place: Not Applicable (all applicable per [facility] Fall risk assessment)   Continuous monitoring: Not Applicable   IVF/IV medications: Not Applicable   Oxygen: Room Air  Bedside sitter: Pt on L2k SI with 1:1 sitter Care Companion in place (name), Report given to sitter, checklist completed, and checklist completed, stop sign in doorway  Isolation: Not Applicable    /57   Pulse 72   Temp 35.9 °C (96.7 °F) (Temporal)   Resp 16   Ht 1.753 m (5' 9\")   Wt 76.2 kg (168 lb)   SpO2 97%  - RA  " Spontaneous, unlabored and symmetrical